# Patient Record
Sex: FEMALE | Race: WHITE | Employment: UNEMPLOYED | URBAN - METROPOLITAN AREA
[De-identification: names, ages, dates, MRNs, and addresses within clinical notes are randomized per-mention and may not be internally consistent; named-entity substitution may affect disease eponyms.]

---

## 2017-01-27 ENCOUNTER — OFFICE VISIT (OUTPATIENT)
Dept: INTERNAL MEDICINE CLINIC | Age: 52
End: 2017-01-27

## 2017-01-27 VITALS
HEART RATE: 101 BPM | DIASTOLIC BLOOD PRESSURE: 84 MMHG | TEMPERATURE: 97.8 F | RESPIRATION RATE: 12 BRPM | SYSTOLIC BLOOD PRESSURE: 126 MMHG | HEIGHT: 70 IN | WEIGHT: 221 LBS | OXYGEN SATURATION: 98 % | BODY MASS INDEX: 31.64 KG/M2

## 2017-01-27 DIAGNOSIS — M79.7 FIBROMYALGIA: ICD-10-CM

## 2017-01-27 DIAGNOSIS — F98.8 ADD (ATTENTION DEFICIT DISORDER): Primary | ICD-10-CM

## 2017-01-27 DIAGNOSIS — J30.1 SEASONAL ALLERGIC RHINITIS DUE TO POLLEN: ICD-10-CM

## 2017-01-27 RX ORDER — DEXTROAMPHETAMINE SACCHARATE, AMPHETAMINE ASPARTATE, DEXTROAMPHETAMINE SULFATE AND AMPHETAMINE SULFATE 7.5; 7.5; 7.5; 7.5 MG/1; MG/1; MG/1; MG/1
30 TABLET ORAL 2 TIMES DAILY
Qty: 60 TAB | Refills: 0 | Status: SHIPPED | OUTPATIENT
Start: 2017-03-27 | End: 2017-04-28 | Stop reason: SDUPTHER

## 2017-01-27 RX ORDER — CYCLOBENZAPRINE HCL 10 MG
10 TABLET ORAL
Qty: 60 TAB | Refills: 0 | Status: SHIPPED | OUTPATIENT
Start: 2017-01-27 | End: 2017-04-28 | Stop reason: SDUPTHER

## 2017-01-27 RX ORDER — DEXTROAMPHETAMINE SACCHARATE, AMPHETAMINE ASPARTATE, DEXTROAMPHETAMINE SULFATE AND AMPHETAMINE SULFATE 7.5; 7.5; 7.5; 7.5 MG/1; MG/1; MG/1; MG/1
30 TABLET ORAL 2 TIMES DAILY
Qty: 60 TAB | Refills: 0 | Status: SHIPPED | OUTPATIENT
Start: 2017-01-27 | End: 2017-01-27 | Stop reason: SDUPTHER

## 2017-01-27 RX ORDER — TRAZODONE HYDROCHLORIDE 100 MG/1
200 TABLET ORAL
Qty: 180 TAB | Refills: 1 | Status: SHIPPED | OUTPATIENT
Start: 2017-01-27 | End: 2017-09-19 | Stop reason: SDUPTHER

## 2017-01-27 RX ORDER — DEXTROAMPHETAMINE SACCHARATE, AMPHETAMINE ASPARTATE, DEXTROAMPHETAMINE SULFATE AND AMPHETAMINE SULFATE 7.5; 7.5; 7.5; 7.5 MG/1; MG/1; MG/1; MG/1
30 TABLET ORAL 2 TIMES DAILY
COMMUNITY
End: 2017-01-27 | Stop reason: ALTCHOICE

## 2017-01-27 RX ORDER — DEXTROAMPHETAMINE SACCHARATE, AMPHETAMINE ASPARTATE, DEXTROAMPHETAMINE SULFATE AND AMPHETAMINE SULFATE 7.5; 7.5; 7.5; 7.5 MG/1; MG/1; MG/1; MG/1
30 TABLET ORAL 2 TIMES DAILY
Qty: 60 TAB | Refills: 0 | Status: SHIPPED | OUTPATIENT
Start: 2017-02-27 | End: 2017-01-27 | Stop reason: SDUPTHER

## 2017-01-27 NOTE — MR AVS SNAPSHOT
Visit Information Date & Time Provider Department Dept. Phone Encounter #  
 1/27/2017  3:45 PM Roxy Bradley MD [facility] Internal Medicine [phone] [id number] Your Appointments 4/20/2017  4:00 PM  
PHYSICAL with Roxy Bradley MD  
[facility] Internal Medicine Fartun Rossi) Appt Note: cpe; sw; . R/S due to being sick [address] [facility] [address] [place] [address] [facility] [address]  
[address] Upcoming Health Maintenance Date Due Hepatitis C Screening 1965 BREAST CANCER SCRN MAMMOGRAM 12/22/2015 PAP AKA CERVICAL CYTOLOGY 9/6/2016 COLONOSCOPY 4/25/2019 DTaP/Tdap/Td series (2 - Td) 8/21/2025 Allergies as of 1/27/2017  Review Complete On: 1/27/2017 By: Lynette Joy LPN Severity Noted Reaction Type Reactions Levaquin [Levofloxacin] High 06/30/2010    Swelling Current Immunizations  Reviewed on 10/28/2015 Name Date Influenza Vaccine 10/26/2013 Influenza Vaccine (Quad) PF 10/10/2014 Influenza Vaccine Whole 1/1/2008 TD Vaccine 1/1/2003 Tdap 8/21/2015 Not reviewed this visit You Were Diagnosed With   
  
 Codes Comments ADD (attention deficit disorder)    -  Primary ICD-10-CM: F98.8 ICD-9-CM: 314.00 Seasonal allergic rhinitis due to pollen     ICD-10-CM: J30.1 ICD-9-CM: 477.0 Fibromyalgia     ICD-10-CM: M79.7 ICD-9-CM: 729.1 Vitals BP Pulse Temp Resp Height(growth percentile) Weight(growth percentile) 126/84 (!) 101 97.8 °F (36.6 °C) (Oral) 12 5' 10\" (1.778 m) 221 lb (100.2 kg) SpO2 BMI OB Status Smoking Status 98% 31.71 kg/m2 Menopause Former Smoker Vitals History BMI and BSA Data Body Mass Index Body Surface Area 31.71 kg/m 2 2.22 m 2 Preferred Pharmacy Pharmacy Name Phone [facility] [address] , [place] [address] [phone] Your Updated Medication List  
  
   
This list is accurate as of: 1/27/17  4:23 PM.  Always use your most recent med list.  
  
  
  
  
 cetirizine 10 mg tablet Commonly known as:  ZYRTEC Take 1 Tab by mouth nightly. cyclobenzaprine 10 mg tablet Commonly known as:  FLEXERIL Take 1 Tab by mouth three (3) times daily as needed for Muscle Spasm(s). dextroamphetamine-amphetamine 30 mg tablet Commonly known as:  ADDERALL Take 1 Tab by mouth two (2) times a dayEarliest Fill Date: 3/27/17. Max Daily Amount: 2 Tabs Start taking on:  3/27/2017  
  
 dicyclomine 10 mg capsule Commonly known as:  BENTYL Take 10 mg by mouth as needed. fluticasone 50 mcg/actuation nasal spray Commonly known as:  Domnick Means 2 Sprays by Both Nostrils route daily. montelukast 10 mg tablet Commonly known as:  SINGULAIR Take 1 Tab by mouth daily. traZODone 100 mg tablet Commonly known as:  Orma Paddy Take 2 Tabs by mouth nightly. Prescriptions Printed Refills  
 dextroamphetamine-amphetamine (ADDERALL) 30 mg tablet 0 Starting on: 3/27/2017 Sig: Take 1 Tab by mouth two (2) times a dayEarliest Fill Date: 3/27/17. Max Daily Amount: 2 Tabs Class: Print Route: Oral  
  
Prescriptions Sent to Pharmacy Refills  
 traZODone (DESYREL) 100 mg tablet 1 Sig: Take 2 Tabs by mouth nightly. Class: Normal  
 Pharmacy: [place] [address] Ph #: [phone] Route: Oral  
 cyclobenzaprine (FLEXERIL) 10 mg tablet 0 Sig: Take 1 Tab by mouth three (3) times daily as needed for Muscle Spasm(s). Class: Normal  
 Pharmacy: [place] [address] Ph #: [phone] Route: Oral  
  
We Performed the Following CBC WITH AUTOMATED DIFF [22973 CPT(R)] HEPATITIS C AB [65319 CPT(R)] LIPID PANEL [60462 CPT(R)] METABOLIC PANEL, COMPREHENSIVE [18500 CPT(R)] REFERRAL TO PHYSICAL THERAPY [DYN73 Custom] TSH RFX ON ABNORMAL TO FREE T4 [[id number] Custom] UA/M W/RFLX CULTURE, ROUTINE [[id number] Custom] Referral Information Referral ID Referred By Referred To  
  
 [id number] [facility] [address] [place] [address] Tessie Deleon Phone: [phone] Fax: [phone] Visits Status Start Date End Date 1 New Request 1/27/17 1/27/18 If your referral has a status of pending review or denied, additional information will be sent to support the outcome of this decision. Introducing [facility]! Dear Mary Galvan: Thank you for requesting a [employer] account. Our records indicate that you already have an active [employer] account. You can access your account anytime at [email or web address] Did you know that you can access your hospital and ER discharge instructions at any time in [employer] Design? You can also review all of your test results from your hospital stay or ER visit. Additional Information If you have questions, please visit the Frequently Asked Questions section of the [employer] website at [email or web address] Remember, Packet Design is NOT to be used for urgent needs. For medical emergencies, dial 911. Now available from your iPhone and Android! Please provide this summary of care documentation to your next provider. Your primary care clinician is listed as Piero [phone] If you have any questions after today's visit, please call [phone].

## 2017-01-28 NOTE — PROGRESS NOTES
HPI:  Shoshana Mathias is a 46y.o. year old female who is here for a routine visit:    Has been feeling well. Some ongoing issues with neck and shoulder and low back pain. Her sleep is good. No depression or anxiety. ADD well controlled. Past Medical History   Diagnosis Date    ADD (attention deficit disorder)     Depression      (spontaneous vaginal delivery)      X 2    Tobacco abuse        Past Surgical History   Procedure Laterality Date    Hx tubal ligation      Hx vein stripping      Hx cholecystectomy      Hx gi       PILANIDAL CYST    Pr chest surgery procedure unlisted       BASAL CELL CANCER ON CHEST    Egd  2006     Dr. Stephanie Cee       Prior to Admission medications    Medication Sig Start Date End Date Taking? Authorizing Provider   traZODone (DESYREL) 100 mg tablet Take 2 Tabs by mouth nightly. 17  Yes Kathryn Oshea III, MD   cyclobenzaprine (FLEXERIL) 10 mg tablet Take 1 Tab by mouth three (3) times daily as needed for Muscle Spasm(s). 17  Yes Kathryn Oshea III, MD   dextroamphetamine-amphetamine (ADDERALL) 30 mg tablet Take 1 Tab by mouth two (2) times a dayEarliest Fill Date: 3/27/17. Max Daily Amount: 2 Tabs 3/27/17  Yes Etelvina Hutson MD   montelukast (SINGULAIR) 10 mg tablet Take 1 Tab by mouth daily. 16  Yes Kathryn Oshea III, MD   cetirizine (ZYRTEC) 10 mg tablet Take 1 Tab by mouth nightly. 10/28/15  Yes Etelvina Hutson MD   fluticasone (FLONASE) 50 mcg/actuation nasal spray 2 Sprays by Both Nostrils route daily. 3/23/15  Yes Kathryn Oshea III, MD   dicyclomine (BENTYL) 10 mg capsule Take 10 mg by mouth as needed. 10/4/14  Yes Historical Provider       Social History     Social History    Marital status:      Spouse name: N/A    Number of children: N/A    Years of education: N/A     Occupational History    Not on file.      Social History Main Topics    Smoking status: Former Smoker     Packs/day: 0.50     Types: Cigarettes     Quit date: 1/28/2013    Smokeless tobacco: Never Used    Alcohol use 1.2 oz/week     2 Glasses of wine per week    Drug use: No    Sexual activity: No     Other Topics Concern    Not on file     Social History Narrative          ROS  Per HPI    Visit Vitals    /84    Pulse (!) 101    Temp 97.8 °F (36.6 °C) (Oral)    Resp 12    Ht 5' 10\" (1.778 m)    Wt 221 lb (100.2 kg)    SpO2 98%    BMI 31.71 kg/m2         Physical Exam   Physical Examination: General appearance - alert, well appearing, and in no distress  Mouth - mucous membranes moist, pharynx normal without lesions  Neck - supple, no significant adenopathy  Lymphatics - no palpable lymphadenopathy, no hepatosplenomegaly  Chest - clear to auscultation, no wheezes, rales or rhonchi, symmetric air entry  Heart - normal rate, regular rhythm, normal S1, S2, no murmurs, rubs, clicks or gallops  Abdomen - soft, nontender, nondistended, no masses or organomegaly  Neurological - alert, oriented, normal speech, no focal findings or movement disorder noted  Musculoskeletal - no joint tenderness, deformity or swelling  Extremities - peripheral pulses normal, no pedal edema, no clubbing or cyanosis  There are trigger spots on the upper back and lower back. Assessment/Plan:  Melody Kwon was seen today for medication evaluation and labs. Diagnoses and all orders for this visit:    ADD (attention deficit disorder) - continue same meds for now. Doing well. -     Discontinue: dextroamphetamine-amphetamine (ADDERALL) 30 mg tablet; Take 1 Tab by mouth two (2) times a dayEarliest Fill Date: 1/27/17. Max Daily Amount: 2 Tabs  -     Discontinue: dextroamphetamine-amphetamine (ADDERALL) 30 mg tablet; Take 1 Tab by mouth two (2) times a dayEarliest Fill Date: 2/27/17. Max Daily Amount: 2 Tabs  -     dextroamphetamine-amphetamine (ADDERALL) 30 mg tablet; Take 1 Tab by mouth two (2) times a dayEarliest Fill Date: 3/27/17.   Max Daily Amount: 2 Tabs    Seasonal allergic rhinitis due to pollen - continue same meds    Fibromyalgia - will refer to PT for dry needling and PT evaluation as well. -     CBC WITH AUTOMATED DIFF  -     HEPATITIS C AB  -     LIPID PANEL  -     METABOLIC PANEL, COMPREHENSIVE  -     TSH RFX ON ABNORMAL TO FREE T4  -     UA/M W/RFLX CULTURE, ROUTINE  -     traZODone (DESYREL) 100 mg tablet; Take 2 Tabs by mouth nightly. -     cyclobenzaprine (FLEXERIL) 10 mg tablet; Take 1 Tab by mouth three (3) times daily as needed for Muscle Spasm(s). -     REFERRAL TO PHYSICAL THERAPY       Follow-up Disposition: 6 months and as needed. Advised her to call back or return to office if symptoms worsen/change/persist.  Discussed expected course/resolution/complications of diagnosis in detail with patient. Medication risks/benefits/costs/interactions/alternatives discussed with patient. She was given an after visit summary which includes diagnoses, current medications, & vitals. She expressed understanding with the diagnosis and plan.

## 2017-04-28 ENCOUNTER — OFFICE VISIT (OUTPATIENT)
Dept: INTERNAL MEDICINE CLINIC | Age: 52
End: 2017-04-28

## 2017-04-28 VITALS
SYSTOLIC BLOOD PRESSURE: 102 MMHG | HEART RATE: 88 BPM | BODY MASS INDEX: 30.15 KG/M2 | HEIGHT: 70 IN | OXYGEN SATURATION: 98 % | WEIGHT: 210.6 LBS | DIASTOLIC BLOOD PRESSURE: 64 MMHG | RESPIRATION RATE: 18 BRPM | TEMPERATURE: 97.8 F

## 2017-04-28 DIAGNOSIS — Z00.00 ROUTINE GENERAL MEDICAL EXAMINATION AT A HEALTH CARE FACILITY: Primary | ICD-10-CM

## 2017-04-28 DIAGNOSIS — M79.7 FIBROMYALGIA: ICD-10-CM

## 2017-04-28 DIAGNOSIS — Z12.39 BREAST CANCER SCREENING: ICD-10-CM

## 2017-04-28 DIAGNOSIS — F98.8 ADD (ATTENTION DEFICIT DISORDER): ICD-10-CM

## 2017-04-28 RX ORDER — DEXTROAMPHETAMINE SACCHARATE, AMPHETAMINE ASPARTATE, DEXTROAMPHETAMINE SULFATE AND AMPHETAMINE SULFATE 7.5; 7.5; 7.5; 7.5 MG/1; MG/1; MG/1; MG/1
30 TABLET ORAL 2 TIMES DAILY
Qty: 60 TAB | Refills: 0 | Status: SHIPPED | OUTPATIENT
Start: 2017-04-28 | End: 2017-08-25

## 2017-04-28 RX ORDER — DEXTROAMPHETAMINE SACCHARATE, AMPHETAMINE ASPARTATE, DEXTROAMPHETAMINE SULFATE AND AMPHETAMINE SULFATE 7.5; 7.5; 7.5; 7.5 MG/1; MG/1; MG/1; MG/1
30 TABLET ORAL 2 TIMES DAILY
Qty: 60 TAB | Refills: 0 | Status: SHIPPED | OUTPATIENT
Start: 2017-05-28 | End: 2017-08-25

## 2017-04-28 RX ORDER — DEXTROAMPHETAMINE SACCHARATE, AMPHETAMINE ASPARTATE, DEXTROAMPHETAMINE SULFATE AND AMPHETAMINE SULFATE 7.5; 7.5; 7.5; 7.5 MG/1; MG/1; MG/1; MG/1
30 TABLET ORAL 2 TIMES DAILY
Qty: 60 TAB | Refills: 0 | Status: SHIPPED | OUTPATIENT
Start: 2017-06-28 | End: 2017-09-19 | Stop reason: SDUPTHER

## 2017-04-28 RX ORDER — CYCLOBENZAPRINE HCL 10 MG
10 TABLET ORAL
Qty: 60 TAB | Refills: 2 | Status: SHIPPED | OUTPATIENT
Start: 2017-04-28

## 2017-04-28 NOTE — MR AVS SNAPSHOT
Visit Information Date & Time Provider Department Dept. Phone Encounter #  
 4/28/2017  2:00 PM Annia Rivera MD [facility] Internal Medicine [phone] [id number] Upcoming Health Maintenance Date Due Hepatitis C Screening 1965 BREAST CANCER SCRN MAMMOGRAM 12/22/2015 PAP AKA CERVICAL CYTOLOGY 9/6/2016 COLONOSCOPY 4/25/2019 DTaP/Tdap/Td series (2 - Td) 8/21/2025 Allergies as of 4/28/2017  Review Complete On: 4/28/2017 By: Annia Rivera MD  
  
 Severity Noted Reaction Type Reactions Levaquin [Levofloxacin] High 06/30/2010    Swelling Current Immunizations  Reviewed on 10/28/2015 Name Date Influenza Vaccine 10/26/2013 Influenza Vaccine (Quad) PF 10/10/2014 Influenza Vaccine Whole 1/1/2008 TD Vaccine 1/1/2003 Tdap 8/21/2015 Not reviewed this visit You Were Diagnosed With   
  
 Codes Comments Routine general medical examination at a health care facility    -  Primary ICD-10-CM: Z00.00 ICD-9-CM: V70.0 Fibromyalgia     ICD-10-CM: M79.7 ICD-9-CM: 729.1 ADD (attention deficit disorder)     ICD-10-CM: F98.8 ICD-9-CM: 314.00 Breast cancer screening     ICD-10-CM: Z12.39 
ICD-9-CM: V76.10 Vitals BP Pulse Temp Resp Height(growth percentile) Weight(growth percentile) 102/64 88 97.8 °F (36.6 °C) (Oral) 18 5' 10.25\" (1.784 m) 210 lb 9.6 oz (95.5 kg) SpO2 BMI OB Status Smoking Status 98% 30 kg/m2 Menopause Former Smoker Vitals History BMI and BSA Data Body Mass Index Body Surface Area  
 30 kg/m 2 2.18 m 2 Preferred Pharmacy Pharmacy Name Phone [facility] [address] , [place] [address] [phone] Your Updated Medication List  
  
   
This list is accurate as of: 4/28/17  2:38 PM.  Always use your most recent med list.  
  
  
  
  
 cetirizine 10 mg tablet Commonly known as:  ZYRTEC Take 1 Tab by mouth nightly. cyclobenzaprine 10 mg tablet Commonly known as:  FLEXERIL Take 1 Tab by mouth three (3) times daily as needed for Muscle Spasm(s). * dextroamphetamine-amphetamine 30 mg tablet Commonly known as:  ADDERALL Take 1 Tab by mouth two (2) times a dayIndications: ATTENTION-DEFICIT HYPERACTIVITY DISORDER Earliest Fill Date: 4/28/17. Max Daily Amount: 2 Tabs * dextroamphetamine-amphetamine 30 mg tablet Commonly known as:  ADDERALL Take 1 Tab by mouth two (2) times a dayIndications: ATTENTION-DEFICIT HYPERACTIVITY DISORDER Earliest Fill Date: 5/28/17. Max Daily Amount: 2 Tabs Start taking on:  5/28/2017 * dextroamphetamine-amphetamine 30 mg tablet Commonly known as:  ADDERALL Take 1 Tab by mouth two (2) times a dayIndications: ATTENTION-DEFICIT HYPERACTIVITY DISORDER Earliest Fill Date: 6/28/17. Max Daily Amount: 2 Tabs Start taking on:  6/28/2017  
  
 dicyclomine 10 mg capsule Commonly known as:  BENTYL Take 10 mg by mouth as needed. fluticasone 50 mcg/actuation nasal spray Commonly known as:  Kira Bough 2 Sprays by Both Nostrils route daily. montelukast 10 mg tablet Commonly known as:  SINGULAIR Take 1 Tab by mouth daily. traZODone 100 mg tablet Commonly known as:  Cheryl Will Take 2 Tabs by mouth nightly. * Notice: This list has 3 medication(s) that are the same as other medications prescribed for you. Read the directions carefully, and ask your doctor or other care provider to review them with you. Prescriptions Printed Refills  
 dextroamphetamine-amphetamine (ADDERALL) 30 mg tablet 0 Starting on: 6/28/2017 Sig: Take 1 Tab by mouth two (2) times a dayIndications: ATTENTION-DEFICIT HYPERACTIVITY DISORDER Earliest Fill Date: 6/28/17. Max Daily Amount: 2 Tabs Class: Print Route: Oral  
 dextroamphetamine-amphetamine (ADDERALL) 30 mg tablet 0 Starting on: 5/28/2017 Sig: Take 1 Tab by mouth two (2) times a dayIndications: ATTENTION-DEFICIT HYPERACTIVITY DISORDER Earliest Fill Date: 5/28/17. Max Daily Amount: 2 Tabs Class: Print Route: Oral  
 dextroamphetamine-amphetamine (ADDERALL) 30 mg tablet 0 Sig: Take 1 Tab by mouth two (2) times a dayIndications: ATTENTION-DEFICIT HYPERACTIVITY DISORDER Earliest Fill Date: 4/28/17. Max Daily Amount: 2 Tabs Class: Print Route: Oral  
  
Prescriptions Sent to Pharmacy Refills  
 cyclobenzaprine (FLEXERIL) 10 mg tablet 2 Sig: Take 1 Tab by mouth three (3) times daily as needed for Muscle Spasm(s). Class: Normal  
 Pharmacy: [facility] [id number] [facility], [address] [facility] #: [phone] Route: Oral  
  
We Performed the Following REFERRAL TO OBSTETRICS AND GYNECOLOGY [REF51 Custom] REFERRAL TO PHYSICAL THERAPY [XWH77 Custom] To-Do List   
 10/29/2017 Imaging:  [facility] MAMMO BI SCREENING INCL CAD Referral Information Referral ID Referred By Referred To  
  
 [id number] [place] [address], [address] [place] [address] [facility] [address] [place] [address] Phone: [phone] Fax: [phone] Visits Status Start Date End Date 1 New Request 4/28/17 4/28/18 If your referral has a status of pending review or denied, additional information will be sent to support the outcome of this decision. Referral ID Referred By Referred To  
 [id number] [facility] [address] [place] [address] Phone: [phone] Fax: [phone] Visits Status Start Date End Date 1 New Request 4/28/17 4/28/18 If your referral has a status of pending review or denied, additional information will be sent to support the outcome of this decision. Patient Instructions Piriformis Syndrome: Care Instructions Your Care Instructions The piriformis muscle is deep under your rear end (buttock). One end of the muscle connects deep inside the pelvic area, and the other end attaches to the top of the thighbone. This muscle can press on the sciatic nerve that runs from your spine down your leg. When this happens, you may have pain, numbness, and tingling in the buttock and down the back of your leg. This is called piriformis syndrome. The pain may get worse when you sit for a long time or climb stairs. Also, you may be more likely to develop piriformis syndrome if you run or walk often. Your doctor will check for other causes of your pain before treating this syndrome. Treatment may include stretching exercises, massage, and medicine for the pain and swelling. If these do not help, you may get a shot of steroid medicine. Until the pain is gone, you may need to rest the muscle and limit activities like running. Exercises and a change in how you move and sit may be enough to stop the pressure on the nerve. Follow-up care is a key part of your treatment and safety. Be sure to make and go to all appointments, and call your doctor if you are having problems. It's also a good idea to know your test results and keep a list of the medicines you take. How can you care for yourself at home? · If your doctor thinks that strenuous exercise is causing your problem, stop or cut back on activities such as running. You may find swimming to be a good exercise for a while. · Stretch the piriformis muscle. Nettie Huynh on your back. ¨ Bend one leg at the knee and keep the other leg flat on the ground. ¨ Raise your bent knee up and then move it across your body. Hold the outside of the knee with the opposite hand. ¨ Gently pull the knee with your hand toward the opposite shoulder. ¨ Hold the stretch for at least 15 to 30 seconds. Switch legs. ¨ Do the stretch several times each day. · Massage the muscle to relieve pressure. ¨ Sit on the floor. Lean to one side so that the hip on your sore side is off the ground. Put a tennis ball under your buttock on that side. ¨ As you put weight onto the tennis ball, you may find spots that are especially sore. Move gently so that the tennis ball gently massages each of the sore spots. · Use ice or heat to help reduce pain. Put ice or a cold pack or a heating pad set on low or a warm cloth on the sore area for 10 to 20 minutes at a time. Put a thin cloth between the ice pack or heating pad and your skin. · Ask your doctor if you can take an over-the-counter pain medicine, such as acetaminophen (Tylenol), ibuprofen (Advil, Motrin), or naproxen (Aleve). Be safe with medicines. Read and follow all instructions on the label. · Have your doctor or a physical therapist watch how you move. You may need physical therapy or special inserts in your shoes (orthotics) to help you move in a way that does not put pressure on your nerves. When should you call for help? Watch closely for changes in your health, and be sure to contact your doctor if: 
· You do not feel better after several weeks of home care. · Your pain gets worse. · Your leg becomes weak or numb. Where can you learn more? Go to http://mike-loreto.info/. Enter I792 in the search box to learn more about \"Piriformis Syndrome: Care Instructions. \" Current as of: May 23, 2016 Content Version: 11.2 © 3099-9192 [employer]. Care instructions adapted under license by [employer] (which disclaims liability or warranty for this information). If you have questions about a medical condition or this instruction, always ask your healthcare professional. [place] [address] any warranty or liability for your use of this information. Piriformis Syndrome: Exercises Your Care Instructions Here are some examples of typical rehabilitation exercises for your condition. Start each exercise slowly. Ease off the exercise if you start to have pain. Your doctor or physical therapist will tell you when you can start these exercises and which ones will work best for you. How to do the exercises Hip rotator stretch 1. Lie on your back with both knees bent and your feet flat on the floor. 2. Put the ankle of your affected leg on your opposite thigh near your knee. 3. Use your hand to gently push your knee (on your affected leg) away from your body until you feel a gentle stretch around your hip. 4. Hold the stretch for 15 to 30 seconds. 5. Repeat 2 to 4 times. 6. Switch legs and repeat steps 1 through 5. Piriformis stretch 1. Lie on your back with your legs straight. 2. Lift your affected leg and bend your knee. With your opposite hand, reach across your body, and then gently pull your knee toward your opposite shoulder. 3. Hold the stretch for 15 to 30 seconds. 4. Repeat with your other leg. 5. Repeat 2 to 4 times on each side. Lower abdominal strengthening 1. Lie on your back with your knees bent and your feet flat on the floor. 2. Tighten your belly muscles by pulling your belly button in toward your spine. 3. Lift one foot off the floor and bring your knee toward your chest, so that your knee is straight above your hip and your leg is bent like the letter \"L. \" 
4. Lift the other knee up to the same position. 5. Lower one leg at a time to the starting position. 6. Keep alternating legs until you have lifted each leg 8 to 12 times. 7. Be sure to keep your belly muscles tight and your back still as you are moving your legs. Be sure to breathe normally. Follow-up care is a key part of your treatment and safety. Be sure to make and go to all appointments, and call your doctor if you are having problems. It's also a good idea to know your test results and keep a list of the medicines you take. Where can you learn more? Go to http://mike-loreto.info/. Enter X164 in the search box to learn more about \"Piriformis Syndrome: Exercises. \" Current as of: May 23, 2016 Content Version: 11.2 © 0889-2001 [employer]. Care instructions adapted under license by [employer] (which disclaims liability or warranty for this information). If you have questions about a medical condition or this instruction, always ask your healthcare professional. [address] any warranty or liability for your use of this information. Introducing [facility]! Dear Buck Tobin: Thank you for requesting a imagine account. Our records indicate that you already have an active imagine account. You can access your account anytime at [email or web address] Did you know that you can access your hospital and ER discharge instructions at any time in imagine? You can also review all of your test results from your hospital stay or ER visit. Additional Information If you have questions, please visit the Frequently Asked Questions section of the imagine website at [email or web address] Remember, imagine is NOT to be used for urgent needs. For medical emergencies, dial 911. Now available from your iPhone and Android! Please provide this summary of care documentation to your next provider. Your primary care clinician is listed as Piero [phone] If you have any questions after today's visit, please call [phone].

## 2017-04-28 NOTE — PROGRESS NOTES
Chief Complaint   Patient presents with    Complete Physical     Goals that were addressed/or need to be completed after this visit:  Health Maintenance Due   Topic Date Due    Hepatitis C Screening  1965    BREAST CANCER SCRN MAMMOGRAM  12/22/2015    PAP AKA CERVICAL CYTOLOGY  09/06/2016     Health Maintenance:  Cancer screening:   Cervical: not up to date - referral placed to Dr. Leon Mathur.  Breast: not up to date - order placed. Labs:    Hepatitis C Screening:  Drawn today w/ routine labs.

## 2017-04-28 NOTE — PATIENT INSTRUCTIONS
Piriformis Syndrome: Care Instructions  Your Care Instructions    The piriformis muscle is deep under your rear end (buttock). One end of the muscle connects deep inside the pelvic area, and the other end attaches to the top of the thighbone. This muscle can press on the sciatic nerve that runs from your spine down your leg. When this happens, you may have pain, numbness, and tingling in the buttock and down the back of your leg. This is called piriformis syndrome. The pain may get worse when you sit for a long time or climb stairs. Also, you may be more likely to develop piriformis syndrome if you run or walk often. Your doctor will check for other causes of your pain before treating this syndrome. Treatment may include stretching exercises, massage, and medicine for the pain and swelling. If these do not help, you may get a shot of steroid medicine. Until the pain is gone, you may need to rest the muscle and limit activities like running. Exercises and a change in how you move and sit may be enough to stop the pressure on the nerve. Follow-up care is a key part of your treatment and safety. Be sure to make and go to all appointments, and call your doctor if you are having problems. It's also a good idea to know your test results and keep a list of the medicines you take. How can you care for yourself at home? · If your doctor thinks that strenuous exercise is causing your problem, stop or cut back on activities such as running. You may find swimming to be a good exercise for a while. · Stretch the piriformis muscle. Val Schwarz on your back. ¨ Bend one leg at the knee and keep the other leg flat on the ground. ¨ Raise your bent knee up and then move it across your body. Hold the outside of the knee with the opposite hand. ¨ Gently pull the knee with your hand toward the opposite shoulder. ¨ Hold the stretch for at least 15 to 30 seconds. Switch legs. ¨ Do the stretch several times each day.   · Massage the muscle to relieve pressure. ¨ Sit on the floor. Lean to one side so that the hip on your sore side is off the ground. Put a tennis ball under your buttock on that side. ¨ As you put weight onto the tennis ball, you may find spots that are especially sore. Move gently so that the tennis ball gently massages each of the sore spots. · Use ice or heat to help reduce pain. Put ice or a cold pack or a heating pad set on low or a warm cloth on the sore area for 10 to 20 minutes at a time. Put a thin cloth between the ice pack or heating pad and your skin. · Ask your doctor if you can take an over-the-counter pain medicine, such as acetaminophen (Tylenol), ibuprofen (Advil, Motrin), or naproxen (Aleve). Be safe with medicines. Read and follow all instructions on the label. · Have your doctor or a physical therapist watch how you move. You may need physical therapy or special inserts in your shoes (orthotics) to help you move in a way that does not put pressure on your nerves. When should you call for help? Watch closely for changes in your health, and be sure to contact your doctor if:  · You do not feel better after several weeks of home care. · Your pain gets worse. · Your leg becomes weak or numb. Where can you learn more? Go to http://mike-loreto.info/. Enter O054 in the search box to learn more about \"Piriformis Syndrome: Care Instructions. \"  Current as of: May 23, 2016  Content Version: 11.2  © 9013-3415 [employer]. Care instructions adapted under license by [employer] (which disclaims liability or warranty for this information). If you have questions about a medical condition or this instruction, always ask your healthcare professional. [address] any warranty or liability for your use of this information.        Piriformis Syndrome: Exercises  Your Care Instructions  Here are some examples of typical rehabilitation exercises for your condition. Start each exercise slowly. Ease off the exercise if you start to have pain. Your doctor or physical therapist will tell you when you can start these exercises and which ones will work best for you. How to do the exercises  Hip rotator stretch    1. Lie on your back with both knees bent and your feet flat on the floor. 2. Put the ankle of your affected leg on your opposite thigh near your knee. 3. Use your hand to gently push your knee (on your affected leg) away from your body until you feel a gentle stretch around your hip. 4. Hold the stretch for 15 to 30 seconds. 5. Repeat 2 to 4 times. 6. Switch legs and repeat steps 1 through 5. Piriformis stretch    1. Lie on your back with your legs straight. 2. Lift your affected leg and bend your knee. With your opposite hand, reach across your body, and then gently pull your knee toward your opposite shoulder. 3. Hold the stretch for 15 to 30 seconds. 4. Repeat with your other leg. 5. Repeat 2 to 4 times on each side. Lower abdominal strengthening    1. Lie on your back with your knees bent and your feet flat on the floor. 2. Tighten your belly muscles by pulling your belly button in toward your spine. 3. Lift one foot off the floor and bring your knee toward your chest, so that your knee is straight above your hip and your leg is bent like the letter \"L. \"  4. Lift the other knee up to the same position. 5. Lower one leg at a time to the starting position. 6. Keep alternating legs until you have lifted each leg 8 to 12 times. 7. Be sure to keep your belly muscles tight and your back still as you are moving your legs. Be sure to breathe normally. Follow-up care is a key part of your treatment and safety. Be sure to make and go to all appointments, and call your doctor if you are having problems. It's also a good idea to know your test results and keep a list of the medicines you take. Where can you learn more?   Go to http://mike-loreto.info/. Enter H147 in the search box to learn more about \"Piriformis Syndrome: Exercises. \"  Current as of: May 23, 2016  Content Version: 11.2  © 1717-7901 [employer]. Care instructions adapted under license by [employer] (which disclaims liability or warranty for this information). If you have questions about a medical condition or this instruction, always ask your healthcare professional. [place] [address] any warranty or liability for your use of this information.

## 2017-04-29 NOTE — PROGRESS NOTES
Subjective:   46 y.o. female for Well Woman Check. Her gyne and breast care is done elsewhere by her Ob-Gyne physician. Patient Active Problem List    Diagnosis Date Noted    Fibromyalgia 2017    Allergic rhinitis 2013    ADD (attention deficit disorder)      Current Outpatient Prescriptions   Medication Sig Dispense Refill    cyclobenzaprine (FLEXERIL) 10 mg tablet Take 1 Tab by mouth three (3) times daily as needed for Muscle Spasm(s). 60 Tab 2    [START ON 2017] dextroamphetamine-amphetamine (ADDERALL) 30 mg tablet Take 1 Tab by mouth two (2) times a dayIndications: ATTENTION-DEFICIT HYPERACTIVITY DISORDER Earliest Fill Date: 17. Max Daily Amount: 2 Tabs 60 Tab 0    [START ON 2017] dextroamphetamine-amphetamine (ADDERALL) 30 mg tablet Take 1 Tab by mouth two (2) times a dayIndications: ATTENTION-DEFICIT HYPERACTIVITY DISORDER Earliest Fill Date: 17. Max Daily Amount: 2 Tabs 60 Tab 0    dextroamphetamine-amphetamine (ADDERALL) 30 mg tablet Take 1 Tab by mouth two (2) times a dayIndications: ATTENTION-DEFICIT HYPERACTIVITY DISORDER Earliest Fill Date: 17. Max Daily Amount: 2 Tabs 60 Tab 0    traZODone (DESYREL) 100 mg tablet Take 2 Tabs by mouth nightly. 180 Tab 1    montelukast (SINGULAIR) 10 mg tablet Take 1 Tab by mouth daily. 90 Tab 1    cetirizine (ZYRTEC) 10 mg tablet Take 1 Tab by mouth nightly.  fluticasone (FLONASE) 50 mcg/actuation nasal spray 2 Sprays by Both Nostrils route daily. 1 Bottle 5    dicyclomine (BENTYL) 10 mg capsule Take 10 mg by mouth as needed.        Allergies   Allergen Reactions    Levaquin [Levofloxacin] Swelling     Past Medical History:   Diagnosis Date    ADD (attention deficit disorder)     Depression      (spontaneous vaginal delivery)     X 2    Tobacco abuse      Past Surgical History:   Procedure Laterality Date    CHEST SURGERY PROCEDURE UNLISTED      BASAL CELL CANCER ON CHEST    EGD  2006     [facility]    HX CHOLECYSTECTOMY  9/06    HX GI      PILANIDAL CYST    HX TUBAL LIGATION      HX VEIN STRIPPING       Family History   Problem Relation Age of Onset    Cancer Mother      THYROID    Heart Disease Mother      CHF    Stroke Mother     Lung Disease Mother     Cancer Father      PROSTATE    Hypertension Sister     Hypertension Brother     Elevated Lipids Brother     Hypertension Brother     Elevated Lipids Brother     Diabetes Brother     Attention Deficit Disorder Son     Diabetes Maternal Grandfather      Social History   Substance Use Topics    Smoking status: Former Smoker     Packs/day: 0.50     Types: Cigarettes     Quit date: 1/28/2013    Smokeless tobacco: Never Used    Alcohol use 1.2 oz/week     2 Glasses of wine per week        Lab Results  Component Value Date/Time   WBC 4.4 10/10/2014 09:23 AM   HGB 13.5 10/10/2014 09:23 AM   HCT 41.5 10/10/2014 09:23 AM   PLATELET 102 37/03/8527 09:23 AM   MCV 94 10/10/2014 09:23 AM       Lab Results  Component Value Date/Time   Cholesterol, total 241 10/10/2014 09:23 AM   HDL Cholesterol 84 10/10/2014 09:23 AM   LDL, calculated 136 10/10/2014 09:23 AM   Triglyceride 103 10/10/2014 09:23 AM       Lab Results  Component Value Date/Time   ALT (SGPT) 25 10/10/2014 09:23 AM   AST (SGOT) 21 10/10/2014 09:23 AM   Alk. phosphatase 61 10/10/2014 09:23 AM   Bilirubin, total 0.4 10/10/2014 09:23 AM       Lab Results  Component Value Date/Time   GFR est AA 92 10/10/2014 09:23 AM   GFR est non-AA 80 10/10/2014 09:23 AM   Creatinine 0.86 10/10/2014 09:23 AM   BUN 22 10/10/2014 09:23 AM   Sodium 138 10/10/2014 09:23 AM   Potassium 4.5 10/10/2014 09:23 AM   Chloride 97 10/10/2014 09:23 AM   CO2 26 10/10/2014 09:23 AM      Lab Results  Component Value Date/Time   TSH 2.350 10/10/2014 09:23 AM      Lab Results   Component Value Date/Time    Glucose 86 10/10/2014 09:23 AM         Specific concerns today: Ongoing issues with neck and shoulder pain.  Some lower back pain as well. Has not seen the GYN but will make an appt for a pap. Is due for a mammo as well. .    Review of Systems  A comprehensive review of systems was negative except for that written in the HPI. Objective:   Blood pressure 102/64, pulse 88, temperature 97.8 °F (36.6 °C), temperature source Oral, resp. rate 18, height 5' 10.25\" (1.784 m), weight 210 lb 9.6 oz (95.5 kg), SpO2 98 %. Physical Examination:   General appearance - alert, well appearing, and in no distress  Eyes - pupils equal and reactive, extraocular eye movements intact  Ears - bilateral TM's and external ear canals normal  Nose - normal and patent, no erythema, discharge or polyps  Mouth - mucous membranes moist, pharynx normal without lesions  Neck - supple, no significant adenopathy  Lymphatics - no palpable lymphadenopathy, no hepatosplenomegaly  Chest - clear to auscultation, no wheezes, rales or rhonchi, symmetric air entry  Heart - normal rate, regular rhythm, normal S1, S2, no murmurs, rubs, clicks or gallops  Abdomen - soft, nontender, nondistended, no masses or organomegaly  Back exam - full range of motion, no tenderness, palpable spasm or pain on motion  Neurological - alert, oriented, normal speech, no focal findings or movement disorder noted  Musculoskeletal - no joint tenderness, deformity or swelling  Extremities - peripheral pulses normal, no pedal edema, no clubbing or cyanosis   Some mild tenderness over the muscles of the neck and shoulders. Assessment/Plan:     lose weight, increase physical activity, routine labs ordered  Timmy Lema was seen today for complete physical.    Diagnoses and all orders for this visit:    Routine general medical examination at a health care facility       Liset Boswell    Fibromyalgia - stable but not perfect. Will try PT with dry needling.   -     cyclobenzaprine (FLEXERIL) 10 mg tablet;  Take 1 Tab by mouth three (3) times daily as needed for Muscle Spasm(s). -     REFERRAL TO PHYSICAL THERAPY    ADD (attention deficit disorder) - stable on same meds. -     dextroamphetamine-amphetamine (ADDERALL) 30 mg tablet; Take 1 Tab by mouth two (2) times a dayIndications: ATTENTION-DEFICIT HYPERACTIVITY DISORDER Earliest Fill Date: 6/28/17. Max Daily Amount: 2 Tabs  -     dextroamphetamine-amphetamine (ADDERALL) 30 mg tablet; Take 1 Tab by mouth two (2) times a dayIndications: ATTENTION-DEFICIT HYPERACTIVITY DISORDER Earliest Fill Date: 4/28/17. Max Daily Amount: 2 Tabs    Breast cancer screening  -     [facility] MAMMO BI SCREENING INCL CAD; Future    Other orders  -     dextroamphetamine-amphetamine (ADDERALL) 30 mg tablet; Take 1 Tab by mouth two (2) times a dayIndications: ATTENTION-DEFICIT HYPERACTIVITY DISORDER Earliest Fill Date: 5/28/17. Max Daily Amount: 2 Tabs       Follow-up Disposition: 12 months and as needed   Advised her to call back or return to office if symptoms worsen/change/persist.  Discussed expected course/resolution/complications of diagnosis in detail with patient. Medication risks/benefits/costs/interactions/alternatives discussed with patient. She was given an after visit summary which includes diagnoses, current medications, & vitals. She expressed understanding with the diagnosis and plan.

## 2017-08-25 ENCOUNTER — HOSPITAL ENCOUNTER (EMERGENCY)
Age: 52
Discharge: STILL ADMITTED OR ADMINISTRATIVE | End: 2017-08-25
Attending: FAMILY MEDICINE

## 2017-09-19 DIAGNOSIS — F98.8 ADD (ATTENTION DEFICIT DISORDER): ICD-10-CM

## 2017-09-19 DIAGNOSIS — M79.7 FIBROMYALGIA: ICD-10-CM

## 2017-09-19 RX ORDER — DEXTROAMPHETAMINE SACCHARATE, AMPHETAMINE ASPARTATE, DEXTROAMPHETAMINE SULFATE AND AMPHETAMINE SULFATE 7.5; 7.5; 7.5; 7.5 MG/1; MG/1; MG/1; MG/1
30 TABLET ORAL 2 TIMES DAILY
Qty: 60 TAB | Refills: 0 | Status: SHIPPED | OUTPATIENT
Start: 2017-09-19 | End: 2017-09-20 | Stop reason: SDUPTHER

## 2017-09-19 RX ORDER — TRAZODONE HYDROCHLORIDE 100 MG/1
200 TABLET ORAL
Qty: 60 TAB | Refills: 0 | Status: SHIPPED | OUTPATIENT
Start: 2017-09-19 | End: 2017-09-20 | Stop reason: CLARIF

## 2017-09-19 NOTE — TELEPHONE ENCOUNTER
From: Wilner Pinto  To: Angel Luis Temple MD  Sent: 9/19/2017 1:46 AM EDT  Subject: Medication Renewal Request    Original authorizing provider: MD Grecia Dangrolanda Barnett would like a refill of the following medications:  traZODone (DESYREL) 100 mg tablet Sedrick Romberg III, MD]  dextroamphetamine-amphetamine (ADDERALL) 30 mg tablet Angel Luis Temple MD]    [facility] [address] [facility]    Comment:

## 2017-09-20 RX ORDER — TRAZODONE HYDROCHLORIDE 100 MG/1
200 TABLET ORAL
Qty: 60 TAB | Refills: 5 | Status: SHIPPED | OUTPATIENT
Start: 2017-09-20

## 2017-09-20 RX ORDER — DEXTROAMPHETAMINE SACCHARATE, AMPHETAMINE ASPARTATE, DEXTROAMPHETAMINE SULFATE AND AMPHETAMINE SULFATE 7.5; 7.5; 7.5; 7.5 MG/1; MG/1; MG/1; MG/1
30 TABLET ORAL 2 TIMES DAILY
Qty: 60 TAB | Refills: 0 | Status: SHIPPED | OUTPATIENT
Start: 2017-11-19 | End: 2017-12-19

## 2017-09-20 RX ORDER — DEXTROAMPHETAMINE SACCHARATE, AMPHETAMINE ASPARTATE, DEXTROAMPHETAMINE SULFATE AND AMPHETAMINE SULFATE 7.5; 7.5; 7.5; 7.5 MG/1; MG/1; MG/1; MG/1
30 TABLET ORAL 2 TIMES DAILY
Qty: 60 TAB | Refills: 0 | Status: SHIPPED | OUTPATIENT
Start: 2017-10-19 | End: 2017-11-18

## 2018-01-26 RX ORDER — MONTELUKAST SODIUM 10 MG/1
TABLET ORAL
Qty: 30 TAB | Refills: 0 | Status: SHIPPED | OUTPATIENT
Start: 2018-01-26

## 2022-03-18 PROBLEM — M79.7 FIBROMYALGIA: Status: ACTIVE | Noted: 2017-01-27

## 2025-01-27 SDOH — HEALTH STABILITY: PHYSICAL HEALTH: ON AVERAGE, HOW MANY MINUTES DO YOU ENGAGE IN EXERCISE AT THIS LEVEL?: 30 MIN

## 2025-01-27 SDOH — HEALTH STABILITY: PHYSICAL HEALTH: ON AVERAGE, HOW MANY DAYS PER WEEK DO YOU ENGAGE IN MODERATE TO STRENUOUS EXERCISE (LIKE A BRISK WALK)?: 4 DAYS

## 2025-01-29 ENCOUNTER — OFFICE VISIT (OUTPATIENT)
Age: 60
End: 2025-01-29
Payer: COMMERCIAL

## 2025-01-29 VITALS
DIASTOLIC BLOOD PRESSURE: 63 MMHG | HEIGHT: 69 IN | OXYGEN SATURATION: 98 % | TEMPERATURE: 98 F | WEIGHT: 224 LBS | HEART RATE: 97 BPM | RESPIRATION RATE: 16 BRPM | BODY MASS INDEX: 33.18 KG/M2 | SYSTOLIC BLOOD PRESSURE: 112 MMHG

## 2025-01-29 DIAGNOSIS — R63.5 WEIGHT GAIN: Primary | ICD-10-CM

## 2025-01-29 DIAGNOSIS — E78.49 OTHER HYPERLIPIDEMIA: ICD-10-CM

## 2025-01-29 DIAGNOSIS — M15.0 PRIMARY OSTEOARTHRITIS INVOLVING MULTIPLE JOINTS: ICD-10-CM

## 2025-01-29 DIAGNOSIS — Z12.12 ENCOUNTER FOR COLORECTAL CANCER SCREENING: ICD-10-CM

## 2025-01-29 DIAGNOSIS — K21.9 GASTROESOPHAGEAL REFLUX DISEASE WITHOUT ESOPHAGITIS: ICD-10-CM

## 2025-01-29 DIAGNOSIS — Z12.11 ENCOUNTER FOR COLORECTAL CANCER SCREENING: ICD-10-CM

## 2025-01-29 DIAGNOSIS — M79.7 FIBROMYALGIA: ICD-10-CM

## 2025-01-29 DIAGNOSIS — Z00.00 ADULT GENERAL MEDICAL EXAM: ICD-10-CM

## 2025-01-29 DIAGNOSIS — J30.1 ALLERGIC RHINITIS DUE TO POLLEN, UNSPECIFIED SEASONALITY: ICD-10-CM

## 2025-01-29 DIAGNOSIS — R63.5 WEIGHT GAIN: ICD-10-CM

## 2025-01-29 DIAGNOSIS — F90.9 ATTENTION DEFICIT HYPERACTIVITY DISORDER (ADHD), UNSPECIFIED ADHD TYPE: ICD-10-CM

## 2025-01-29 DIAGNOSIS — Z72.0 TOBACCO ABUSE: ICD-10-CM

## 2025-01-29 PROCEDURE — 99204 OFFICE O/P NEW MOD 45 MIN: CPT | Performed by: STUDENT IN AN ORGANIZED HEALTH CARE EDUCATION/TRAINING PROGRAM

## 2025-01-29 RX ORDER — MELOXICAM 7.5 MG/1
7.5 TABLET ORAL DAILY
COMMUNITY
Start: 2022-01-18

## 2025-01-29 RX ORDER — PLANT STANOL ESTER 450 MG
550 TABLET ORAL DAILY
COMMUNITY

## 2025-01-29 RX ORDER — MONTELUKAST SODIUM 10 MG/1
10 TABLET ORAL DAILY
COMMUNITY

## 2025-01-29 RX ORDER — MAGNESIUM GLUCONATE 27 MG(500)
500 TABLET ORAL DAILY
COMMUNITY

## 2025-01-29 RX ORDER — ESOMEPRAZOLE MAGNESIUM 40 MG/1
40 CAPSULE, DELAYED RELEASE ORAL
COMMUNITY
Start: 2022-01-18

## 2025-01-29 RX ORDER — GABAPENTIN 300 MG/1
300 CAPSULE ORAL 3 TIMES DAILY
COMMUNITY
End: 2025-02-04 | Stop reason: SDUPTHER

## 2025-01-29 RX ORDER — ONDANSETRON 4 MG/1
4 TABLET, FILM COATED ORAL EVERY 8 HOURS PRN
COMMUNITY

## 2025-01-29 RX ORDER — ALBUTEROL SULFATE 90 UG/1
2 INHALANT RESPIRATORY (INHALATION) EVERY 4 HOURS PRN
COMMUNITY
Start: 2025-01-24

## 2025-01-29 RX ORDER — PSYLLIUM HUSK 0.4 G
1 CAPSULE ORAL 3 TIMES DAILY
COMMUNITY

## 2025-01-29 RX ORDER — TRAZODONE HYDROCHLORIDE 100 MG/1
200 TABLET ORAL NIGHTLY
COMMUNITY

## 2025-01-29 RX ORDER — DOXYCYCLINE 100 MG/1
100 CAPSULE ORAL 2 TIMES DAILY
COMMUNITY
Start: 2025-01-24

## 2025-01-29 RX ORDER — ACETYLCYSTEINE 600 MG
1 CAPSULE ORAL 2 TIMES DAILY
COMMUNITY

## 2025-01-29 RX ORDER — EZETIMIBE 10 MG/1
10 TABLET ORAL DAILY
COMMUNITY
Start: 2023-12-18

## 2025-01-29 RX ORDER — DEXTROAMPHETAMINE SACCHARATE, AMPHETAMINE ASPARTATE, DEXTROAMPHETAMINE SULFATE AND AMPHETAMINE SULFATE 7.5; 7.5; 7.5; 7.5 MG/1; MG/1; MG/1; MG/1
30 TABLET ORAL 2 TIMES DAILY
COMMUNITY
Start: 2010-11-18

## 2025-01-29 SDOH — ECONOMIC STABILITY: FOOD INSECURITY: WITHIN THE PAST 12 MONTHS, THE FOOD YOU BOUGHT JUST DIDN'T LAST AND YOU DIDN'T HAVE MONEY TO GET MORE.: NEVER TRUE

## 2025-01-29 SDOH — ECONOMIC STABILITY: FOOD INSECURITY: WITHIN THE PAST 12 MONTHS, YOU WORRIED THAT YOUR FOOD WOULD RUN OUT BEFORE YOU GOT MONEY TO BUY MORE.: NEVER TRUE

## 2025-01-29 ASSESSMENT — PATIENT HEALTH QUESTIONNAIRE - PHQ9
SUM OF ALL RESPONSES TO PHQ9 QUESTIONS 1 & 2: 0
2. FEELING DOWN, DEPRESSED OR HOPELESS: NOT AT ALL
SUM OF ALL RESPONSES TO PHQ QUESTIONS 1-9: 0
SUM OF ALL RESPONSES TO PHQ QUESTIONS 1-9: 0
1. LITTLE INTEREST OR PLEASURE IN DOING THINGS: NOT AT ALL
SUM OF ALL RESPONSES TO PHQ QUESTIONS 1-9: 0
SUM OF ALL RESPONSES TO PHQ QUESTIONS 1-9: 0

## 2025-01-29 NOTE — PROGRESS NOTES
Verified name and birth date for privacy precautions.   Chart reviewed in preparation for today's visit.     Chief Complaint   Patient presents with    New Patient          Health Maintenance Due   Topic    Depression Screen     HIV screen     Hepatitis C screen     Hepatitis B vaccine (1 of 3 - 19+ 3-dose series)    Cervical cancer screen     Diabetes screen     Lipids     Breast cancer screen     Colorectal Cancer Screen     Shingles vaccine (2 of 2)    Flu vaccine (1)    COVID-19 Vaccine (1 - 2023-24 season)         Wt Readings from Last 3 Encounters:   01/29/25 101.6 kg (224 lb)     Temp Readings from Last 3 Encounters:   01/29/25 98 °F (36.7 °C)     BP Readings from Last 3 Encounters:   01/29/25 112/63     Pulse Readings from Last 3 Encounters:   01/29/25 97       Social Determinants of Health     Tobacco Use: Low Risk  (1/29/2025)    Patient History     Smoking Tobacco Use: Passive Smoke Exposure - Never Smoker     Smokeless Tobacco Use: Never     Passive Exposure: Past   Recent Concern: Tobacco Use - Medium Risk (1/29/2025)    Patient History     Smoking Tobacco Use: Former     Smokeless Tobacco Use: Never     Passive Exposure: Past   Alcohol Use: Not on file   Financial Resource Strain: Not on file   Food Insecurity: No Food Insecurity (1/29/2025)    Hunger Vital Sign     Worried About Running Out of Food in the Last Year: Never true     Ran Out of Food in the Last Year: Never true   Transportation Needs: No Transportation Needs (1/29/2025)    PRAPARE - Transportation     Lack of Transportation (Medical): No     Lack of Transportation (Non-Medical): No   Physical Activity: Insufficiently Active (1/27/2025)    Exercise Vital Sign     Days of Exercise per Week: 4 days     Minutes of Exercise per Session: 30 min   Stress: Not on file   Social Connections: Not on file   Intimate Partner Violence: Not on file   Depression: Not at risk (1/29/2025)    PHQ-2     PHQ-2 Score: 0   Housing Stability: Low Risk  
screen  Never done    Pneumococcal 50+ years Vaccine (1 of 1 - PCV) Never done    Lung Cancer Screening &/or Counseling  Never done    Colorectal Cancer Screen  04/25/2019    Shingles vaccine (2 of 2) 08/14/2023    Flu vaccine (1) 08/01/2024    COVID-19 Vaccine (1 - 2024-25 season) Never done    DTaP/Tdap/Td vaccine (2 - Td or Tdap) 08/21/2025    A1C test (Diabetic or Prediabetic)  01/29/2026    Depression Screen  01/29/2026    Lipids  01/29/2030    Hepatitis A vaccine  Aged Out    Hib vaccine  Aged Out    Polio vaccine  Aged Out    Meningococcal (ACWY) vaccine  Aged Out    Diabetes screen  Discontinued       Past Medical History    Patient Active Problem List   Diagnosis    Allergic rhinitis    Fibromyalgia    ADD (attention deficit disorder)    Primary osteoarthritis involving multiple joints    Other hyperlipidemia    Gastroesophageal reflux disease without esophagitis    Tobacco abuse       Past Medical History:   Diagnosis Date    ADHD (attention deficit hyperactivity disorder) 2009    Fibromyalgia 2013    GERD (gastroesophageal reflux disease) 2013    Irritable bowel syndrome 1989    Obesity     Osteoarthritis 2009       Prior to Admission medications    Medication Sig Start Date End Date Taking? Authorizing Provider   traZODone (DESYREL) 100 MG tablet Take 2 tablets by mouth nightly   Yes Provider, MD Alex   ondansetron (ZOFRAN) 4 MG tablet Take 1 tablet by mouth every 8 hours as needed   Yes Provider, MD Alex   montelukast (SINGULAIR) 10 MG tablet Take 1 tablet by mouth daily   Yes Provider, MD Alex   meloxicam (MOBIC) 7.5 MG tablet Take 1 tablet by mouth daily 1/18/22  Yes Provider, MD Alex   ezetimibe (ZETIA) 10 MG tablet Take 1 tablet by mouth daily 12/18/23  Yes Provider, MD Alex   esomeprazole (NEXIUM) 40 MG delayed release capsule Take 1 capsule by mouth every morning (before breakfast) 1/18/22  Yes Provider, Historical, MD   doxycycline hyclate (VIBRAMYCIN) 100 MG

## 2025-01-30 ENCOUNTER — PATIENT MESSAGE (OUTPATIENT)
Age: 60
End: 2025-01-30

## 2025-01-30 DIAGNOSIS — F90.9 ATTENTION DEFICIT HYPERACTIVITY DISORDER (ADHD), UNSPECIFIED ADHD TYPE: Primary | ICD-10-CM

## 2025-01-30 LAB
ALBUMIN SERPL-MCNC: 3.8 G/DL (ref 3.5–5)
ALBUMIN/GLOB SERPL: 1.2 (ref 1.1–2.2)
ALP SERPL-CCNC: 91 U/L (ref 45–117)
ALT SERPL-CCNC: 50 U/L (ref 12–78)
ANION GAP SERPL CALC-SCNC: 7 MMOL/L (ref 2–12)
AST SERPL-CCNC: 26 U/L (ref 15–37)
BILIRUB SERPL-MCNC: 0.6 MG/DL (ref 0.2–1)
BUN SERPL-MCNC: 18 MG/DL (ref 6–20)
BUN/CREAT SERPL: 21 (ref 12–20)
CALCIUM SERPL-MCNC: 8.7 MG/DL (ref 8.5–10.1)
CHLORIDE SERPL-SCNC: 101 MMOL/L (ref 97–108)
CHOLEST SERPL-MCNC: 239 MG/DL
CO2 SERPL-SCNC: 26 MMOL/L (ref 21–32)
CREAT SERPL-MCNC: 0.87 MG/DL (ref 0.55–1.02)
ERYTHROCYTE [DISTWIDTH] IN BLOOD BY AUTOMATED COUNT: 12.7 % (ref 11.5–14.5)
EST. AVERAGE GLUCOSE BLD GHB EST-MCNC: 117 MG/DL
GLOBULIN SER CALC-MCNC: 3.1 G/DL (ref 2–4)
GLUCOSE SERPL-MCNC: 111 MG/DL (ref 65–100)
HBA1C MFR BLD: 5.7 % (ref 4–5.6)
HCT VFR BLD AUTO: 43.4 % (ref 35–47)
HDLC SERPL-MCNC: 74 MG/DL
HDLC SERPL: 3.2 (ref 0–5)
HGB BLD-MCNC: 14.1 G/DL (ref 11.5–16)
LDLC SERPL CALC-MCNC: 119.6 MG/DL (ref 0–100)
MCH RBC QN AUTO: 30.8 PG (ref 26–34)
MCHC RBC AUTO-ENTMCNC: 32.5 G/DL (ref 30–36.5)
MCV RBC AUTO: 94.8 FL (ref 80–99)
NRBC # BLD: 0 K/UL (ref 0–0.01)
NRBC BLD-RTO: 0 PER 100 WBC
PLATELET # BLD AUTO: 363 K/UL (ref 150–400)
PMV BLD AUTO: 9.2 FL (ref 8.9–12.9)
POTASSIUM SERPL-SCNC: 4.3 MMOL/L (ref 3.5–5.1)
PROT SERPL-MCNC: 6.9 G/DL (ref 6.4–8.2)
RBC # BLD AUTO: 4.58 M/UL (ref 3.8–5.2)
SODIUM SERPL-SCNC: 134 MMOL/L (ref 136–145)
TRIGL SERPL-MCNC: 227 MG/DL
TSH SERPL DL<=0.05 MIU/L-ACNC: 2.57 UIU/ML (ref 0.36–3.74)
VLDLC SERPL CALC-MCNC: 45.4 MG/DL
WBC # BLD AUTO: 9.8 K/UL (ref 3.6–11)

## 2025-01-31 RX ORDER — DEXTROAMPHETAMINE SACCHARATE, AMPHETAMINE ASPARTATE, DEXTROAMPHETAMINE SULFATE AND AMPHETAMINE SULFATE 7.5; 7.5; 7.5; 7.5 MG/1; MG/1; MG/1; MG/1
30 TABLET ORAL 2 TIMES DAILY
Qty: 60 TABLET | Refills: 0 | Status: SHIPPED | OUTPATIENT
Start: 2025-03-02 | End: 2025-04-01

## 2025-01-31 RX ORDER — DEXTROAMPHETAMINE SACCHARATE, AMPHETAMINE ASPARTATE, DEXTROAMPHETAMINE SULFATE AND AMPHETAMINE SULFATE 7.5; 7.5; 7.5; 7.5 MG/1; MG/1; MG/1; MG/1
30 TABLET ORAL 2 TIMES DAILY
Qty: 60 TABLET | Refills: 0 | Status: SHIPPED | OUTPATIENT
Start: 2025-01-31 | End: 2025-03-02

## 2025-01-31 RX ORDER — DEXTROAMPHETAMINE SACCHARATE, AMPHETAMINE ASPARTATE, DEXTROAMPHETAMINE SULFATE AND AMPHETAMINE SULFATE 7.5; 7.5; 7.5; 7.5 MG/1; MG/1; MG/1; MG/1
30 TABLET ORAL 2 TIMES DAILY
Qty: 60 TABLET | Refills: 0 | Status: SHIPPED | OUTPATIENT
Start: 2025-04-01 | End: 2025-05-01

## 2025-02-04 LAB — DRUGS UR: NORMAL

## 2025-02-06 PROBLEM — Z72.0 TOBACCO ABUSE: Status: ACTIVE | Noted: 2025-02-06

## 2025-02-06 PROBLEM — K21.9 GASTROESOPHAGEAL REFLUX DISEASE WITHOUT ESOPHAGITIS: Status: ACTIVE | Noted: 2025-02-06

## 2025-02-06 PROBLEM — M15.0 PRIMARY OSTEOARTHRITIS INVOLVING MULTIPLE JOINTS: Status: ACTIVE | Noted: 2025-02-06

## 2025-02-06 PROBLEM — E78.49 OTHER HYPERLIPIDEMIA: Status: ACTIVE | Noted: 2025-02-06

## 2025-03-18 DIAGNOSIS — F90.9 ATTENTION DEFICIT HYPERACTIVITY DISORDER (ADHD), UNSPECIFIED ADHD TYPE: ICD-10-CM

## 2025-03-19 NOTE — TELEPHONE ENCOUNTER
Pt last seen on 1/29/2025. Due to return in 6 months.    Has appt on 7/28/2025.    Rx last filled on 3/2/25 #60/0RF.  Appear pharmacy has Rx for 4/1/25.    Will forward to MD for refill.

## 2025-03-21 RX ORDER — DEXTROAMPHETAMINE SACCHARATE, AMPHETAMINE ASPARTATE, DEXTROAMPHETAMINE SULFATE AND AMPHETAMINE SULFATE 7.5; 7.5; 7.5; 7.5 MG/1; MG/1; MG/1; MG/1
30 TABLET ORAL 2 TIMES DAILY
Qty: 60 TABLET | Refills: 0 | OUTPATIENT
Start: 2025-03-21 | End: 2025-04-20

## 2025-06-08 DIAGNOSIS — M79.7 FIBROMYALGIA: ICD-10-CM

## 2025-06-09 RX ORDER — GABAPENTIN 300 MG/1
300 CAPSULE ORAL 3 TIMES DAILY
Qty: 270 CAPSULE | Refills: 1 | OUTPATIENT
Start: 2025-06-09 | End: 2025-12-06

## 2025-06-09 RX ORDER — MONTELUKAST SODIUM 10 MG/1
10 TABLET ORAL DAILY
Qty: 90 TABLET | Refills: 1 | Status: SHIPPED | OUTPATIENT
Start: 2025-06-09

## 2025-06-09 NOTE — TELEPHONE ENCOUNTER
Last office visit 1/29/25  Upcoming appointment 7/28/25    No info available on last fill of singulair

## 2025-07-28 ENCOUNTER — OFFICE VISIT (OUTPATIENT)
Age: 60
End: 2025-07-28
Payer: COMMERCIAL

## 2025-07-28 VITALS
TEMPERATURE: 97.7 F | HEART RATE: 95 BPM | OXYGEN SATURATION: 93 % | RESPIRATION RATE: 16 BRPM | WEIGHT: 224 LBS | BODY MASS INDEX: 33.18 KG/M2 | SYSTOLIC BLOOD PRESSURE: 131 MMHG | DIASTOLIC BLOOD PRESSURE: 75 MMHG | HEIGHT: 69 IN

## 2025-07-28 DIAGNOSIS — F90.9 ATTENTION DEFICIT HYPERACTIVITY DISORDER (ADHD), UNSPECIFIED ADHD TYPE: ICD-10-CM

## 2025-07-28 DIAGNOSIS — M79.7 FIBROMYALGIA: Primary | ICD-10-CM

## 2025-07-28 DIAGNOSIS — F51.01 PRIMARY INSOMNIA: ICD-10-CM

## 2025-07-28 DIAGNOSIS — I87.2 VENOUS INSUFFICIENCY: ICD-10-CM

## 2025-07-28 DIAGNOSIS — E78.49 OTHER HYPERLIPIDEMIA: ICD-10-CM

## 2025-07-28 PROCEDURE — 99214 OFFICE O/P EST MOD 30 MIN: CPT | Performed by: STUDENT IN AN ORGANIZED HEALTH CARE EDUCATION/TRAINING PROGRAM

## 2025-07-28 RX ORDER — DEXTROAMPHETAMINE SACCHARATE, AMPHETAMINE ASPARTATE, DEXTROAMPHETAMINE SULFATE AND AMPHETAMINE SULFATE 7.5; 7.5; 7.5; 7.5 MG/1; MG/1; MG/1; MG/1
30 TABLET ORAL 2 TIMES DAILY
Qty: 60 TABLET | Refills: 0 | Status: SHIPPED | OUTPATIENT
Start: 2025-09-26 | End: 2025-10-26

## 2025-07-28 RX ORDER — LANOLIN ALCOHOL/MO/W.PET/CERES
1000 CREAM (GRAM) TOPICAL DAILY
COMMUNITY

## 2025-07-28 RX ORDER — TRAZODONE HYDROCHLORIDE 100 MG/1
200 TABLET ORAL NIGHTLY
Qty: 180 TABLET | Refills: 3 | Status: SHIPPED | OUTPATIENT
Start: 2025-07-28

## 2025-07-28 RX ORDER — DULOXETIN HYDROCHLORIDE 30 MG/1
CAPSULE, DELAYED RELEASE ORAL
Qty: 150 CAPSULE | Refills: 0 | Status: SHIPPED | OUTPATIENT
Start: 2025-07-28 | End: 2025-10-26

## 2025-07-28 RX ORDER — DEXTROAMPHETAMINE SACCHARATE, AMPHETAMINE ASPARTATE, DEXTROAMPHETAMINE SULFATE AND AMPHETAMINE SULFATE 7.5; 7.5; 7.5; 7.5 MG/1; MG/1; MG/1; MG/1
30 TABLET ORAL 2 TIMES DAILY
Qty: 60 TABLET | Refills: 0 | Status: SHIPPED | OUTPATIENT
Start: 2025-08-27 | End: 2025-09-26

## 2025-07-28 RX ORDER — EZETIMIBE 10 MG/1
10 TABLET ORAL DAILY
Qty: 90 TABLET | Refills: 3 | Status: SHIPPED | OUTPATIENT
Start: 2025-07-28

## 2025-07-28 RX ORDER — DEXTROAMPHETAMINE SACCHARATE, AMPHETAMINE ASPARTATE, DEXTROAMPHETAMINE SULFATE AND AMPHETAMINE SULFATE 7.5; 7.5; 7.5; 7.5 MG/1; MG/1; MG/1; MG/1
30 TABLET ORAL 2 TIMES DAILY
Qty: 60 TABLET | Refills: 0 | Status: SHIPPED | OUTPATIENT
Start: 2025-07-28 | End: 2025-08-27

## 2025-07-28 NOTE — PROGRESS NOTES
Verified name and birth date for privacy precautions.   Chart reviewed in preparation for today's visit.     Chief Complaint   Patient presents with    Discuss Medications          Health Maintenance Due   Topic    HIV screen     Hepatitis C screen     Hepatitis B vaccine (1 of 3 - 19+ 3-dose series)    Breast cancer screen     Pneumococcal 50+ years Vaccine (1 of 1 - PCV)    Lung Cancer Screening &/or Counseling     Colorectal Cancer Screen     Shingles vaccine (2 of 2)    COVID-19 Vaccine (1 - 2024-25 season)         Wt Readings from Last 3 Encounters:   07/28/25 101.6 kg (224 lb)   01/29/25 101.6 kg (224 lb)     Temp Readings from Last 3 Encounters:   07/28/25 97.7 °F (36.5 °C)   01/29/25 98 °F (36.7 °C)     BP Readings from Last 3 Encounters:   07/28/25 131/75   01/29/25 112/63     Pulse Readings from Last 3 Encounters:   07/28/25 95   01/29/25 97       Social Drivers of Health     Tobacco Use: High Risk (7/28/2025)    Patient History     Smoking Tobacco Use: Every Day     Smokeless Tobacco Use: Never     Passive Exposure: Past   Alcohol Use: Not on file   Financial Resource Strain: Not on file   Food Insecurity: No Food Insecurity (1/29/2025)    Hunger Vital Sign     Worried About Running Out of Food in the Last Year: Never true     Ran Out of Food in the Last Year: Never true   Transportation Needs: No Transportation Needs (1/29/2025)    PRAPARE - Transportation     Lack of Transportation (Medical): No     Lack of Transportation (Non-Medical): No   Physical Activity: Insufficiently Active (1/27/2025)    Exercise Vital Sign     Days of Exercise per Week: 4 days     Minutes of Exercise per Session: 30 min   Stress: Not on file   Social Connections: Not on file   Intimate Partner Violence: Not on file   Depression: Not at risk (1/29/2025)    PHQ-2     PHQ-2 Score: 0   Housing Stability: Low Risk  (1/29/2025)    Housing Stability Vital Sign     Unable to Pay for Housing in the Last Year: No     Number of Times

## 2025-07-28 NOTE — ASSESSMENT & PLAN NOTE
Will request records from [facility]. Discussed low salt diet, recommend to wear compression socks/elevate feet when sitting.

## 2025-07-28 NOTE — PROGRESS NOTES
Julia Landa is a 59 y.o. year old female who presents today (07/28/25) for: Discuss Medications      Assessment & Plan:   1. Fibromyalgia  Assessment & Plan:  Symptoms not well controlled. Re-start Cymbalta. Start with 30 mg x 30 days, then increase to 60 mg daily. Continue gabapentin for now and once she has titrated to 60 mg of Cymbalta will start tapering this.  Orders:  -     DULoxetine (CYMBALTA) 30 MG extended release capsule; Take 1 capsule by mouth daily for 30 days, THEN 2 capsules daily., Disp-150 capsule, R-0Normal  2. Other hyperlipidemia  -     ezetimibe (ZETIA) 10 MG tablet; Take 1 tablet by mouth daily, Disp-90 tablet, R-3Normal  3. Primary insomnia  -     traZODone (DESYREL) 100 MG tablet; Take 2 tablets by mouth nightly, Disp-180 tablet, R-3Normal  4. Attention deficit hyperactivity disorder (ADHD), unspecified ADHD type  Assessment & Plan:  Stable, symptoms controlled with Adderall. PDMP monitored, no concern for misuse. Refills sent to pharmacy today.  Orders:  -     amphetamine-dextroamphetamine (ADDERALL) 30 MG tablet; Take 1 tablet by mouth 2 times daily for 30 days. Max Daily Amount: 60 mg, Disp-60 tablet, R-0Normal  -     amphetamine-dextroamphetamine (ADDERALL) 30 MG tablet; Take 1 tablet by mouth 2 times daily for 30 days. Max Daily Amount: 60 mg, Disp-60 tablet, R-0Normal  -     amphetamine-dextroamphetamine (ADDERALL) 30 MG tablet; Take 1 tablet by mouth 2 times daily for 30 days. Max Daily Amount: 60 mg, Disp-60 tablet, R-0Normal  5. Venous insufficiency  Assessment & Plan:  Will request records from [facility]. Discussed low salt diet, recommend to wear compression socks/elevate feet when sitting.      Return in about 3 months (around 10/28/2025) for Medication Followup.      Subjective:     History of Present Illness  The patient presents for follow up of chronic conditions:     Fibromyalgia  Symptoms have worsened, confining her to bed 2-3 days/week. Stress may be exacerbating

## 2025-07-28 NOTE — ASSESSMENT & PLAN NOTE
Stable, symptoms controlled with Adderall. PDMP monitored, no concern for misuse. Refills sent to pharmacy today.

## 2025-07-28 NOTE — ASSESSMENT & PLAN NOTE
Symptoms not well controlled. Re-start Cymbalta. Start with 30 mg x 30 days, then increase to 60 mg daily. Continue gabapentin for now and once she has titrated to 60 mg of Cymbalta will start tapering this.

## 2025-08-01 ENCOUNTER — HOSPITAL ENCOUNTER (OUTPATIENT)
Age: 60
Discharge: HOME | End: 2025-08-01
Payer: COMMERCIAL

## 2025-08-01 VITALS — BODY MASS INDEX: 33.18 KG/M2 | WEIGHT: 224 LBS | HEIGHT: 69 IN

## 2025-08-01 DIAGNOSIS — Z12.31 VISIT FOR SCREENING MAMMOGRAM: ICD-10-CM

## 2025-08-01 PROCEDURE — 77063 BREAST TOMOSYNTHESIS BI: CPT

## 2025-08-20 DIAGNOSIS — M79.7 FIBROMYALGIA: ICD-10-CM

## 2025-08-21 ENCOUNTER — HOSPITAL ENCOUNTER (OUTPATIENT)
Facility: HOSPITAL | Age: 60
Discharge: HOME | End: 2025-08-24
Attending: STUDENT IN AN ORGANIZED HEALTH CARE EDUCATION/TRAINING PROGRAM
Payer: COMMERCIAL

## 2025-08-21 DIAGNOSIS — R92.8 ABNORMAL MAMMOGRAM: ICD-10-CM

## 2025-08-21 PROCEDURE — 77065 DX MAMMO INCL CAD UNI: CPT

## 2025-08-21 RX ORDER — GABAPENTIN 300 MG/1
300 CAPSULE ORAL 3 TIMES DAILY
Qty: 90 CAPSULE | Refills: 0 | Status: SHIPPED | OUTPATIENT
Start: 2025-08-21 | End: 2025-09-20